# Patient Record
Sex: FEMALE | URBAN - METROPOLITAN AREA
[De-identification: names, ages, dates, MRNs, and addresses within clinical notes are randomized per-mention and may not be internally consistent; named-entity substitution may affect disease eponyms.]

---

## 2022-02-22 ENCOUNTER — OFFICE VISIT (OUTPATIENT)
Dept: URBAN - METROPOLITAN AREA CLINIC 13 | Facility: CLINIC | Age: 37
End: 2022-02-22
Payer: OTHER GOVERNMENT

## 2022-02-22 DIAGNOSIS — E11.3593 TYPE 2 DIAB WITH PROLIF DIAB RTNOP WITHOUT MACULAR EDEMA, BI: ICD-10-CM

## 2022-02-22 DIAGNOSIS — H33.43 TRACTION DETACHMENT OF RETINA, BILATERAL: Primary | ICD-10-CM

## 2022-02-22 PROCEDURE — 92235 FLUORESCEIN ANGRPH MLTIFRAME: CPT | Performed by: OPHTHALMOLOGY

## 2022-02-22 PROCEDURE — 67228 TREATMENT X10SV RETINOPATHY: CPT | Performed by: OPHTHALMOLOGY

## 2022-02-22 PROCEDURE — 99204 OFFICE O/P NEW MOD 45 MIN: CPT | Performed by: OPHTHALMOLOGY

## 2022-02-22 PROCEDURE — 92250 FUNDUS PHOTOGRAPHY W/I&R: CPT | Performed by: OPHTHALMOLOGY

## 2022-02-22 PROCEDURE — 92134 CPTRZ OPH DX IMG PST SGM RTA: CPT | Performed by: OPHTHALMOLOGY

## 2022-02-22 ASSESSMENT — INTRAOCULAR PRESSURE
OD: 18
OS: 15

## 2022-02-22 NOTE — IMPRESSION/PLAN
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 FA OU 02/22/2022 = leak c/w dense NVD/NVE OU
OCT OU = no SRF/IRF OD mac off OS Plan: OD: inferior TRD in better eye OS: confirmed NLP vision with total TRD Rec check Optos FA OU

OD: TRD not involving macula - rec PRP in hopes of arresting progression of TRD
OS: Given NLP vision (confirmed by MRB), would rec comfort care PRP laser RBAs explained. .  Pt. informed that VA does not generally improve after laser. Goal is to preserve & maintain current VA. The risks of laser include loss of PVA, chance of hemorrhage & loss of eye. This includes dec. VA, need for mult. laser tx, scotoma, persistent hemorrhaging, persistent poor vision, dec.peripheral/night vision. The patient understands that some important surgical tasks may be performed by a fellow under my direct supervision, and consents to such. Patient understands and accepts. PRP LASER COMPLETED TODAY as per protocol without complications. laser spots placed outside the temporal arcades. 

1m OCT OU

## 2022-02-22 NOTE — IMPRESSION/PLAN
Impression: Type 2 diab with prolif diab rtnop without macular edema, bi: [id number]. Plan: Please see above.

## 2022-06-09 ENCOUNTER — OFFICE VISIT (OUTPATIENT)
Dept: URBAN - METROPOLITAN AREA CLINIC 13 | Facility: CLINIC | Age: 37
End: 2022-06-09
Payer: OTHER GOVERNMENT

## 2022-06-09 DIAGNOSIS — E11.3593 TYPE 2 DIAB WITH PROLIF DIAB RTNOP WITHOUT MACULAR EDEMA, BI: ICD-10-CM

## 2022-06-09 DIAGNOSIS — H43.12 VITREOUS HEMORRHAGE, LEFT EYE: ICD-10-CM

## 2022-06-09 DIAGNOSIS — H33.43 TRACTION DETACHMENT OF RETINA, BILATERAL: Primary | ICD-10-CM

## 2022-06-09 DIAGNOSIS — H25.13 AGE-RELATED NUCLEAR CATARACT, BILATERAL: ICD-10-CM

## 2022-06-09 PROCEDURE — 92134 CPTRZ OPH DX IMG PST SGM RTA: CPT | Performed by: OPHTHALMOLOGY

## 2022-06-09 PROCEDURE — 67028 INJECTION EYE DRUG: CPT | Performed by: OPHTHALMOLOGY

## 2022-06-09 PROCEDURE — 99214 OFFICE O/P EST MOD 30 MIN: CPT | Performed by: OPHTHALMOLOGY

## 2022-06-09 PROCEDURE — 92250 FUNDUS PHOTOGRAPHY W/I&R: CPT | Performed by: OPHTHALMOLOGY

## 2022-06-09 ASSESSMENT — INTRAOCULAR PRESSURE
OS: 20
OD: 17

## 2022-06-09 NOTE — IMPRESSION/PLAN
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 FA OU 02/22/2022 = leak c/w dense NVD/NVE OU
OCT OU = no view OD mac off OS Plan: OD: worsening TRD and VH in better eye OS: NLP = comfort measures OD: progressing disease, even after PRP at last visit OS: Given NLP vision, would rec comfort care There is a tractional retinal detachment (TRD). We discussed the natural history and the risks and benefit of repairing the TRD, which consists of vitrectomy surgery vs observation. With repair, hopefully we can reduce the risk of further visual loss. Risks of surgery include but are not limited to loss of eye, blindness, infection, scar tissue formation, recurrent retinal tears and detachment, glaucoma, change in refractive error, ptosis, need for further surgery, epiretinal membranes, CME and use of gas or silicone oil. The patient elects to proceed with vitrectomy surgery. 25g PPV/EL/poss SO (NO GAS) x TRD OD Patient lives at [place]. NO GAS for surgery. D/w patient the possible need for SO and then staged SO removal in the future. Patient voices understanding.

## 2022-06-09 NOTE — IMPRESSION/PLAN
Impression: Type 2 diab with prolif diab rtnop without macular edema, bi: [id number]. Plan: Please see above. Will give Avastin OD today, prior to surgery. Discussed R,B,A of Avastin vs Lucentis vs Eylea vs Beovu vs Vabysmo injection. Discussed no FDA approval with Avastin and compounding risk. Discussed signs and symptoms of inflammation, endophthalmitis, vitreous hemorrhage, retinal tear, retinal detachment. Patient understands and wishes to proceed with Avastin injection today. Timeout was performed before procedure. AVASTIN INJECTION COMPLETED TODAY as per protocol without complications.